# Patient Record
Sex: FEMALE | Race: WHITE | Employment: FULL TIME | URBAN - METROPOLITAN AREA
[De-identification: names, ages, dates, MRNs, and addresses within clinical notes are randomized per-mention and may not be internally consistent; named-entity substitution may affect disease eponyms.]

---

## 2024-02-20 ENCOUNTER — OFFICE VISIT (OUTPATIENT)
Age: 39
End: 2024-02-20

## 2024-02-20 VITALS
DIASTOLIC BLOOD PRESSURE: 83 MMHG | TEMPERATURE: 98.6 F | HEIGHT: 61 IN | BODY MASS INDEX: 28.47 KG/M2 | HEART RATE: 101 BPM | SYSTOLIC BLOOD PRESSURE: 118 MMHG | RESPIRATION RATE: 18 BRPM | WEIGHT: 150.8 LBS | OXYGEN SATURATION: 98 %

## 2024-02-20 DIAGNOSIS — H66.012 NON-RECURRENT ACUTE SUPPURATIVE OTITIS MEDIA OF LEFT EAR WITH SPONTANEOUS RUPTURE OF TYMPANIC MEMBRANE: Primary | ICD-10-CM

## 2024-02-20 RX ORDER — AMOXICILLIN AND CLAVULANATE POTASSIUM 875; 125 MG/1; MG/1
1 TABLET, FILM COATED ORAL 2 TIMES DAILY
COMMUNITY

## 2024-02-20 NOTE — PROGRESS NOTES
Subjective     Chief Complaint   Patient presents with    Otalgia     Left ear clogged, had a telehealth appointment yesterday evening, was prescribed an antibiotic, after going to bed patients left ear started having some blood tinged liquid ooze out. Symptoms started yesterday.       Patient ID:  Galina Grayson is a 38 y.o. female.    Patient is 38 year old female presenting with left ear pain.  She reports having a telehealth visit for possible ear infection and was placed on augmentin.  She reports bloody drainage from left ear last night.  She endorses decreased hearing, dull pain and tinnitus in left ear.   Denies fever or chills.       Otalgia   Associated symptoms include ear discharge.       Review of Systems   Constitutional:  Negative for chills and fever.   HENT:  Positive for ear discharge and ear pain.        History reviewed. No pertinent past medical history.    History reviewed. No pertinent surgical history.    History reviewed. No pertinent family history.    No Known Allergies    Social History     Tobacco Use    Smoking status: Unknown       Objective   Vitals:    02/20/24 1117   BP: 118/83   Pulse: (!) 101   Resp: 18   Temp: 98.6 °F (37 °C)   SpO2: 98%     Physical Exam  Constitutional:       General: She is not in acute distress.     Appearance: Normal appearance. She is not ill-appearing.   HENT:      Head: Normocephalic and atraumatic.      Right Ear: Tympanic membrane, ear canal and external ear normal.      Left Ear: Decreased hearing noted. Drainage and tenderness present. Tympanic membrane is perforated.   Cardiovascular:      Rate and Rhythm: Tachycardia present.      Pulses: Normal pulses.   Pulmonary:      Effort: Pulmonary effort is normal.   Skin:     General: Skin is warm and dry.   Neurological:      Mental Status: She is alert and oriented to person, place, and time.         Assessment & Plan     Diagnoses and all orders for this visit:  Non-recurrent acute suppurative otitis